# Patient Record
Sex: FEMALE | Race: OTHER | NOT HISPANIC OR LATINO
[De-identification: names, ages, dates, MRNs, and addresses within clinical notes are randomized per-mention and may not be internally consistent; named-entity substitution may affect disease eponyms.]

---

## 2021-04-26 PROBLEM — Z00.00 ENCOUNTER FOR PREVENTIVE HEALTH EXAMINATION: Status: ACTIVE | Noted: 2021-04-26

## 2021-04-27 ENCOUNTER — RESULT REVIEW (OUTPATIENT)
Age: 31
End: 2021-04-27

## 2021-04-27 ENCOUNTER — OUTPATIENT (OUTPATIENT)
Dept: OUTPATIENT SERVICES | Facility: HOSPITAL | Age: 31
LOS: 1 days | End: 2021-04-27
Payer: COMMERCIAL

## 2021-04-27 ENCOUNTER — APPOINTMENT (OUTPATIENT)
Dept: ORTHOPEDIC SURGERY | Facility: CLINIC | Age: 31
End: 2021-04-27
Payer: MEDICAID

## 2021-04-27 VITALS — DIASTOLIC BLOOD PRESSURE: 60 MMHG | HEART RATE: 78 BPM | OXYGEN SATURATION: 97 % | SYSTOLIC BLOOD PRESSURE: 110 MMHG

## 2021-04-27 VITALS — BODY MASS INDEX: 26.58 KG/M2 | WEIGHT: 150 LBS | HEIGHT: 63 IN

## 2021-04-27 DIAGNOSIS — M79.7 FIBROMYALGIA: ICD-10-CM

## 2021-04-27 DIAGNOSIS — Z78.9 OTHER SPECIFIED HEALTH STATUS: ICD-10-CM

## 2021-04-27 DIAGNOSIS — F17.200 NICOTINE DEPENDENCE, UNSPECIFIED, UNCOMPLICATED: ICD-10-CM

## 2021-04-27 DIAGNOSIS — M87.9 OSTEONECROSIS, UNSPECIFIED: ICD-10-CM

## 2021-04-27 PROCEDURE — 99204 OFFICE O/P NEW MOD 45 MIN: CPT

## 2021-04-27 PROCEDURE — 73564 X-RAY EXAM KNEE 4 OR MORE: CPT

## 2021-04-27 PROCEDURE — 73564 X-RAY EXAM KNEE 4 OR MORE: CPT | Mod: 26,50

## 2021-04-27 PROCEDURE — 72170 X-RAY EXAM OF PELVIS: CPT | Mod: 26

## 2021-04-27 PROCEDURE — 99072 ADDL SUPL MATRL&STAF TM PHE: CPT

## 2021-04-27 PROCEDURE — 72170 X-RAY EXAM OF PELVIS: CPT

## 2021-04-27 RX ORDER — ARIPIPRAZOLE 2 MG/1
2 TABLET ORAL
Refills: 0 | Status: ACTIVE | COMMUNITY

## 2021-04-27 RX ORDER — DEXTROAMPHETAMINE SACCHARATE, AMPHETAMINE ASPARTATE, DEXTROAMPHETAMINE SULFATE, AND AMPHETAMINE SULFATE 3.75; 3.75; 3.75; 3.75 MG/1; MG/1; MG/1; MG/1
TABLET ORAL
Refills: 0 | Status: ACTIVE | COMMUNITY

## 2021-04-27 RX ORDER — DULOXETINE HYDROCHLORIDE 30 MG/1
CAPSULE, DELAYED RELEASE ORAL
Refills: 0 | Status: ACTIVE | COMMUNITY

## 2021-04-27 RX ORDER — BUPRENORPHINE HCL/NALOXONE HCL 8 MG-2 MG
8-2 TABLET, SUBLINGUAL SUBLINGUAL
Refills: 0 | Status: ACTIVE | COMMUNITY

## 2021-04-27 NOTE — HISTORY OF PRESENT ILLNESS
[___ yrs] : [unfilled] year(s) ago [de-identified] : 31y/o female presenting for evaluation of bilateral knee and lower extremity pain. Left knee has had four surgeries: two for meniscal lesions and two for ACL reconstructions. Last surgery was 7 years ago and the knee has never felt recovered. Still with daily medial and lateral joint line and posterior pain with weightbearing and ambulation, anterior pain with steps. Right knee has developed sharp lateral pain in the last 6 months, as well as anterior pain with steps. Also c/o bilateral calf and plantar fascia pain, constant low back pain. No mechanical symptoms of either knee now. She has a diagnosis of fibromyalgia and is under medical treatment by her rheumatologist. She has a history of oxycodone addiction, now clean for 3 years on suboxone. Denies any prior IVDU. She was an alcoholic for some years as well; now sober. Still vapes nicotine. Other PMH sig for esophagitis/GERD, ADHD. Currently working as a . Tries to stay active with stationary bike and walking up to 2mi/day, but has still put on 20lbs in the last 8mo. Would like to get back into a formal exercise program. [5] : a current pain level of 5/10 [Walking] : worsened by walking [Knee Flexion] : worsened with knee flexion [Rest] : relieved by rest [de-identified] : throbbing/sharp

## 2021-04-27 NOTE — PHYSICAL EXAM
[de-identified] : General appearance: well nourished and hydrated, pleasant, alert and oriented x 3, cooperative.  \par HEENT: normocephalic, EOM intact, wearing mask, external auditory canal clear.  \par Cardiovascular: no lower leg edema, no varicosities, dorsalis pedis pulses palpable and symmetric.  \par Lymphatics: no palpable lymphadenopathy, no lymphedema.  \par Neurologic: sensation is normal, no muscle weakness in upper or lower extremities, patella tendon reflexes present and symmetric.  \par Dermatologic: skin moist, warm, no rash.  \par Spine: cervical spine with normal lordosis and painless range of motion, thoracic spine with normal kyphosis and painless range of motion, lumbosacral spine with normal lordosis and stiff painful range of motion.  Tenderness to palpation along midline lumbar spine and paraspinal musculature.  Sacroiliac joints nontender bilaterally. Negative SLR and crossed SLR tests bilaterally.\par Gait: normal.  \par \par Left knee:\par - Inspection: mild diffuse anterior swelling, negative ecchymosis and erythema.  \par - Wounds: healed arthroscopic portals, transverse medial incision, short direct medial incision.\par - Alignment: normal.  \par - Palpation: circumferential tenderness on palpation.  \par - ROM active: 0-140, medial and lateral pain on deep flexion.\par - Ligamentous laxity: negative Lachman, negative ant. drawer test, negative post. drawer test, negative pivot shift test, stable to varus stress, stable to valgus stress.  \par - Meniscal Test: painful Victoria and Darshan.\par - Popliteal angle: 45 degrees\par - Muscle Test: 5/5 quad strength.  \par \par Right knee:\par - Inspection: negative swelling, ecchymosis, and erythema.  \par - Wounds: none.  \par - Alignment: normal.  \par - Palpation: lateral joint line and peripatellar tenderness on palpation.  \par - ROM active: 0-140, anterior and lateral pain with deep flexion.\par - Ligamentous laxity: negative Lachman, negative ant. drawer test, negative post. drawer test, negative pivot shift test, stable to varus stress, stable to valgus stress.  \par - Meniscal Test: painful Victoria, negative Darshan.  \par - Popliteal angle: 45 degrees\par - Muscle Test: 5/5 quad strength. [de-identified] : AP pelvis and 4 views of the bilateral knees (weightbearing AP, weightbearing Pratt, weightbearing lateral, and Sunrise) were obtained today, interpreted by me, and reviewed with the patient.\par \par Bilateral hips demonstrate normal alignment without arthritis (Tonnis 0). There is no proximal femoral or acetabular deformity. There is no fracture or prior fracture deformity. There is no radiographic evidence of osteonecrosis.\par \par Bilateral sacroiliac joints appear normal without arthrosis.\par \par The left knee demonstrates normal alignment in the coronal and sagittal planes. Bone tunnels are visualized consistent with prior ACL reconstruction. There appears to be a radiolucent screw in the femoral tunnel and a metallic staple on the tibia. There is bicompartmental osteoarthritis with equal medial and lateral involvement, Kellgren-Ismael 1-2. The patella sits at appropriate height and tracks centrally with lateral tilt. Cystic and sclerotic lesions are noted throughout the distal femur consistent with osteonecrosis.\par \par The right knee demonstrates normal alignment in the coronal and sagittal planes. There is no arthritis in the medial or lateral compartments, Kellgren-Ismael 0. The patella sits at appropriate height and tracks centrally with lateral tilt. Cystic and sclerotic lesions are noted throughout the medial distal femur and medial proximal tibia consistent with osteonecrosis.

## 2021-04-27 NOTE — REVIEW OF SYSTEMS
[Joint Pain] : joint pain [Joint Stiffness] : joint stiffness [Joint Swelling] : joint swelling [Feeling Tired] : fatigue [Recent Weight Gain (___ Lbs)] : recent ~M [unfilled] lbs weight gain [Nasal Discharge] : nasal discharge [Anxiety] : anxiety [Depression] : depression [Sleep Disturbances] : ~T sleep disturbances [Feeling Weak] : feeling weak [Muscle Weakness] : muscle weakness [Negative] : Heme/Lymph

## 2021-04-27 NOTE — DISCUSSION/SUMMARY
[de-identified] : 31y/o female with bilateral knee osteonecrosis, multiply operated left knee with chronic postoperative pain, chronic multifocal pain secondary to fibromyalgia\par - Begin PT\par - HEP encouraged\par - Tylenol, gabapentin, Cymbalta as needed; cannot take NSAIDs\par - Follow up after PT; consider MRI if not sufficiently improved. We discussed the difficulty of chronic pain sufferers to achieve complete resolution of symptoms; she seemed to be realistic.

## 2021-05-14 ENCOUNTER — APPOINTMENT (OUTPATIENT)
Dept: OTOLARYNGOLOGY | Facility: CLINIC | Age: 31
End: 2021-05-14

## 2021-06-08 ENCOUNTER — APPOINTMENT (OUTPATIENT)
Dept: ORTHOPEDIC SURGERY | Facility: CLINIC | Age: 31
End: 2021-06-08

## 2022-02-25 ENCOUNTER — APPOINTMENT (OUTPATIENT)
Dept: OTOLARYNGOLOGY | Facility: CLINIC | Age: 32
End: 2022-02-25